# Patient Record
Sex: FEMALE | Race: WHITE | Employment: PART TIME | URBAN - METROPOLITAN AREA
[De-identification: names, ages, dates, MRNs, and addresses within clinical notes are randomized per-mention and may not be internally consistent; named-entity substitution may affect disease eponyms.]

---

## 2024-05-29 ENCOUNTER — HOSPITAL ENCOUNTER (EMERGENCY)
Facility: HOSPITAL | Age: 69
Discharge: HOME | End: 2024-05-30
Attending: EMERGENCY MEDICINE

## 2024-05-29 DIAGNOSIS — M54.41 ACUTE RIGHT-SIDED LOW BACK PAIN WITH RIGHT-SIDED SCIATICA: Primary | ICD-10-CM

## 2024-05-29 PROCEDURE — 96374 THER/PROPH/DIAG INJ IV PUSH: CPT

## 2024-05-29 PROCEDURE — 99284 EMERGENCY DEPT VISIT MOD MDM: CPT

## 2024-05-29 PROCEDURE — 96375 TX/PRO/DX INJ NEW DRUG ADDON: CPT

## 2024-05-29 RX ORDER — ASPIRIN 81 MG/1
75 TABLET ORAL DAILY
COMMUNITY

## 2024-05-29 RX ORDER — ONDANSETRON 2 MG/ML
4 INJECTION INTRAMUSCULAR; INTRAVENOUS ONCE
Status: COMPLETED | OUTPATIENT
Start: 2024-05-29 | End: 2024-05-29

## 2024-05-29 RX ORDER — FESOTERODINE FUMARATE 4 MG/1
8 TABLET, FILM COATED, EXTENDED RELEASE ORAL DAILY
COMMUNITY

## 2024-05-29 RX ORDER — BISOPROLOL FUMARATE AND HYDROCHLOROTHIAZIDE 2.5; 6.25 MG/1; MG/1
1 TABLET ORAL DAILY
COMMUNITY

## 2024-05-29 RX ORDER — VENLAFAXINE HYDROCHLORIDE 225 MG/1
225 TABLET, EXTENDED RELEASE ORAL DAILY
COMMUNITY

## 2024-05-29 RX ORDER — DEXAMETHASONE SODIUM PHOSPHATE 10 MG/ML
10 INJECTION, SOLUTION INTRAMUSCULAR; INTRAVENOUS ONCE
Status: COMPLETED | OUTPATIENT
Start: 2024-05-29 | End: 2024-05-29

## 2024-05-29 RX ORDER — KETOROLAC TROMETHAMINE 30 MG/ML
30 INJECTION, SOLUTION INTRAMUSCULAR; INTRAVENOUS ONCE
Status: COMPLETED | OUTPATIENT
Start: 2024-05-29 | End: 2024-05-29

## 2024-05-29 RX ORDER — ESOMEPRAZOLE MAGNESIUM 40 MG/1
40 CAPSULE, DELAYED RELEASE ORAL
COMMUNITY

## 2024-05-29 RX ORDER — ATORVASTATIN CALCIUM 40 MG/1
40 TABLET, FILM COATED ORAL NIGHTLY
COMMUNITY

## 2024-05-29 RX ORDER — NITROFURANTOIN 25 MG/5ML
5 SUSPENSION ORAL 4 TIMES DAILY
COMMUNITY

## 2024-05-29 RX ORDER — MORPHINE SULFATE 4 MG/ML
4 INJECTION, SOLUTION INTRAMUSCULAR; INTRAVENOUS ONCE
Status: COMPLETED | OUTPATIENT
Start: 2024-05-29 | End: 2024-05-29

## 2024-05-30 VITALS
RESPIRATION RATE: 18 BRPM | DIASTOLIC BLOOD PRESSURE: 73 MMHG | SYSTOLIC BLOOD PRESSURE: 142 MMHG | WEIGHT: 154 LBS | TEMPERATURE: 98 F | HEART RATE: 69 BPM | OXYGEN SATURATION: 100 %

## 2024-05-30 PROCEDURE — 96376 TX/PRO/DX INJ SAME DRUG ADON: CPT

## 2024-05-30 RX ORDER — MORPHINE SULFATE 4 MG/ML
4 INJECTION, SOLUTION INTRAMUSCULAR; INTRAVENOUS ONCE
Status: COMPLETED | OUTPATIENT
Start: 2024-05-30 | End: 2024-05-30

## 2024-05-30 RX ORDER — CYCLOBENZAPRINE HCL 10 MG
10 TABLET ORAL 3 TIMES DAILY PRN
Qty: 20 TABLET | Refills: 0 | Status: SHIPPED | OUTPATIENT
Start: 2024-05-30 | End: 2024-06-06

## 2024-05-30 RX ORDER — HYDROCODONE BITARTRATE AND ACETAMINOPHEN 5; 325 MG/1; MG/1
1 TABLET ORAL EVERY 6 HOURS PRN
Qty: 10 TABLET | Refills: 0 | Status: SHIPPED | OUTPATIENT
Start: 2024-05-30 | End: 2024-06-06

## 2024-05-30 RX ORDER — PREDNISONE 20 MG/1
60 TABLET ORAL DAILY
Qty: 15 TABLET | Refills: 0 | Status: SHIPPED | OUTPATIENT
Start: 2024-05-30 | End: 2024-06-04

## 2024-05-30 NOTE — DISCHARGE INSTRUCTIONS
Medications as prescribed.  Take either the hydrocodone cyclobenzaprine, not both and do not drive when taking either 1.  Prednisone once daily for 5 days.

## 2024-05-30 NOTE — ED INITIAL ASSESSMENT (HPI)
Pt ambulated into the Ed with c/o right-sided sciatica pain. Pt just got off of a flight from [place] today.     A&Ox4

## 2024-05-30 NOTE — ED PROVIDER NOTES
Patient Seen in: [facility] Emergency Department      History     Chief Complaint   Patient presents with    Back Pain     Stated Complaint: sciatica,  hx of same    Subjective:   HPI    68-year-old female presenting for evaluation of right-sided low back pain with pain rating down her right hip to the calf area.  This started about 4 weeks ago.  No trauma or injury of any kind, just started up.  Patient lives in [place] and just arrived here today.  She consulted with her primary care physician at home and has been given equivalent of Tylenol with codeine which really does not do much for her pain.  It is gradually been traveling farther down her leg and getting more intense.  When she is horizontal she feels fine but has more pain when she is upright ambulating.  Leg does not feel weak or heavy, just pain with weightbearing.  No bowel or bladder symptoms.  Is worse today after arriving on her flight from [place] today.    Objective:   History reviewed. No pertinent past medical history.           History reviewed. No pertinent surgical history.             Social History     Socioeconomic History    Marital status:    Tobacco Use    Smoking status: Never    Smokeless tobacco: Never   Vaping Use    Vaping status: Never Used   Substance and Sexual Activity    Alcohol use: Yes     Comment: socially    Drug use: Never              Review of Systems    Positive for stated complaint: sciatica,  hx of same  Other systems are as noted in HPI.  Constitutional and vital signs reviewed.      All other systems reviewed and negative except as noted above.    Physical Exam     ED Triage Vitals [05/29/24 2119]   /77   Pulse 71   Resp 18   Temp 97.5 °F (36.4 °C)   Temp src Temporal   SpO2 96 %   O2 Device None (Room air)       Current Vitals:   Vital Signs  BP: 120/57  Pulse: 68  Resp: 18  Temp: 97.5 °F (36.4 °C)  Temp src: Temporal  MAP (mmHg): 74    Oxygen Therapy  SpO2: 99 %  O2 Device: None (Room  air)            Physical Exam  Vitals and nursing note reviewed.   Constitutional:       Appearance: She is well-developed.   HENT:      Head: Normocephalic and atraumatic.   Eyes:      Conjunctiva/sclera: Conjunctivae normal.      Pupils: Pupils are equal, round, and reactive to light.   Cardiovascular:      Rate and Rhythm: Normal rate and regular rhythm.      Heart sounds: Normal heart sounds.   Pulmonary:      Effort: Pulmonary effort is normal.      Breath sounds: Normal breath sounds.   Abdominal:      General: Bowel sounds are normal.      Palpations: Abdomen is soft.   Musculoskeletal:         General: Normal range of motion.      Cervical back: Normal range of motion and neck supple.   Skin:     General: Skin is warm and dry.   Neurological:      Mental Status: She is alert and oriented to person, place, and time.      Comments: Symmetric strength bilateral lower extremities.  She has pain with straight leg raise at about 15 degrees on the right side.               ED Course   Labs Reviewed - No data to display                   MDM      Pleasant 68-year-old female who just arrived from [place] today presenting with right-sided low back pain and right-sided sciatica.  Then progressing over about a month.  No weakness, no bowel or bladder symptoms.  I do not think she needs any imaging as there was no trauma and she does not have any signs or symptoms concerning for cauda equina syndrome.  Will place IV and medicated with Decadron, Toradol and morphine.      Update at 12:20 PM.  On reassessment patient is definitely feeling better.  Still some pain that she rates 4-5 out of 10 and she would like another dose but it has improved and she is comfortable going home.  Prednisone, hydrocodone, Flexeril for home.          Past Medical History-high cholesterol    Differential diagnosis before testing included sciatica, muscle spasm     Co-morbidities that add to the complexity of management include: none    Testing  ordered during this visit included none        Medications Provided: Decadron, Toradol, morphine            Disposition:          Discharge  I have discussed with the patient the results of test, differential diagnosis, treatment plan, warning signs and symptoms which should prompt immediate return.  They expressed understanding of these instructions and agrees to the following plan provided.  They were given written discharge instructions and agrees to return for any concerns and voiced understanding and all questions were answered.                           Medical Decision Making      Disposition and Plan     Clinical Impression:  1. Acute right-sided low back pain with right-sided sciatica         Disposition:  Discharge  5/30/2024 12:29 am    Follow-up:  [address] [place] [address] [place] [id number] [phone]  Call  choose option 2 for neurosurgery or pain follow up          Medications Prescribed:  Current Discharge Medication List        START taking these medications    Details   cyclobenzaprine 10 MG Oral Tab Take 1 tablet (10 mg total) by mouth 3 (three) times daily as needed for Muscle spasms.  Qty: 20 tablet, Refills: 0    Associated Diagnoses: Acute right-sided low back pain with right-sided sciatica      HYDROcodone-acetaminophen 5-325 MG Oral Tab Take 1 tablet by mouth every 6 (six) hours as needed.  Qty: 10 tablet, Refills: 0    Associated Diagnoses: Acute right-sided low back pain with right-sided sciatica      predniSONE 20 MG Oral Tab Take 3 tablets (60 mg total) by mouth daily for 5 days.  Qty: 15 tablet, Refills: 0